# Patient Record
Sex: FEMALE | Race: OTHER | NOT HISPANIC OR LATINO
[De-identification: names, ages, dates, MRNs, and addresses within clinical notes are randomized per-mention and may not be internally consistent; named-entity substitution may affect disease eponyms.]

---

## 2022-01-19 PROBLEM — Z00.00 ENCOUNTER FOR PREVENTIVE HEALTH EXAMINATION: Status: ACTIVE | Noted: 2022-01-19

## 2022-01-24 ENCOUNTER — APPOINTMENT (OUTPATIENT)
Dept: OTOLARYNGOLOGY | Facility: CLINIC | Age: 27
End: 2022-01-24
Payer: MEDICAID

## 2022-01-24 VITALS
TEMPERATURE: 97.9 F | HEART RATE: 86 BPM | WEIGHT: 218 LBS | SYSTOLIC BLOOD PRESSURE: 126 MMHG | DIASTOLIC BLOOD PRESSURE: 86 MMHG | HEIGHT: 68 IN | OXYGEN SATURATION: 98 % | BODY MASS INDEX: 33.04 KG/M2

## 2022-01-24 PROCEDURE — 99072 ADDL SUPL MATRL&STAF TM PHE: CPT

## 2022-01-24 PROCEDURE — 31231 NASAL ENDOSCOPY DX: CPT

## 2022-01-24 PROCEDURE — 99204 OFFICE O/P NEW MOD 45 MIN: CPT | Mod: 25

## 2022-01-24 NOTE — PHYSICAL EXAM
[Nasal Endoscopy Performed] : nasal endoscopy was performed, see procedure section for findings [] : septum deviated bilaterally [de-identified] : +inflammation and evidence of polyposis on the left [de-identified] : + on the left [Midline] : trachea located in midline position [Normal] : no rashes

## 2022-01-24 NOTE — HISTORY OF PRESENT ILLNESS
[de-identified] : 27 yo female who presents with nasal issues.  She notes significant congestion bilaterally.  This has been present for several years.  She notes there is bilateral yellow drainage at times.  Sometime she will have facial discomfort.  No change in smell or taste.  No dental pain.  In the past she has tried nasal steroids, but not much else.  No ENT issues otherwise.  +hx of asthma, unsure of ASA allergy

## 2022-01-24 NOTE — ASSESSMENT
[FreeTextEntry1] : 27 yo with concern for chronic congestion.  On exam there is evidence of septal deviation, turbinate hypertrophy and nasal polyposis.  At this point I am recommending nasal saline, nasal steroids and CT sinus.  Follow up after to review.  I suspect she will need septoplasty, turbinectomy and endoscopic sinus sx.\par \par - nasal saline, nasal steroids\par - CT sinus\par - fu after the above is completed

## 2022-01-24 NOTE — PROCEDURE
[FreeTextEntry6] : -\par Procedure Note\par   \par Pre-operative Diagnosis: congestion\par Post-operative Diagnosis:  nasal polyposis, sinusitis, septal deviation, turbinate hypertrophy\par Anesthesia: Topical\par Procedure: Bilateral nasal endoscopy\par   \par Procedure Details: \par After topical anesthesia and decongestant, the patient was placed in the supine position. The telescope was passed along the left nasal floor to the nasopharynx. It was then passed into the region of the middle meatus, middle turbinate, and the sphenoethmoid region.  An identical procedure was performed on the right side. \par   \par Findings: \par Mucosa: 	                inflamed\par Nasal septum: 	s shaped deviation	\par Discharge: 	none	\par Turbinates: 	hypertrophied	\par Adenoid: 	                normal	\par Posterior choanae: 	normal	\par Eustachian tubes: 	normal	\par Mucous stranding: 	normal 	\par Lesions: 	                Not present	\par   \par Comments: \par Condition: Stable. Patient tolerated procedure well.\par Complications: None\par \par

## 2022-02-03 ENCOUNTER — APPOINTMENT (OUTPATIENT)
Dept: CT IMAGING | Facility: CLINIC | Age: 27
End: 2022-02-03
Payer: MEDICAID

## 2022-02-03 ENCOUNTER — OUTPATIENT (OUTPATIENT)
Dept: OUTPATIENT SERVICES | Facility: HOSPITAL | Age: 27
LOS: 1 days | End: 2022-02-03

## 2022-02-03 ENCOUNTER — RESULT REVIEW (OUTPATIENT)
Age: 27
End: 2022-02-03

## 2022-02-03 PROCEDURE — 70486 CT MAXILLOFACIAL W/O DYE: CPT | Mod: 26

## 2022-02-11 ENCOUNTER — APPOINTMENT (OUTPATIENT)
Dept: OTOLARYNGOLOGY | Facility: CLINIC | Age: 27
End: 2022-02-11
Payer: MEDICAID

## 2022-02-15 ENCOUNTER — APPOINTMENT (OUTPATIENT)
Dept: OTOLARYNGOLOGY | Facility: CLINIC | Age: 27
End: 2022-02-15
Payer: MEDICAID

## 2022-02-15 VITALS
DIASTOLIC BLOOD PRESSURE: 88 MMHG | HEART RATE: 75 BPM | OXYGEN SATURATION: 98 % | WEIGHT: 218 LBS | HEIGHT: 68 IN | SYSTOLIC BLOOD PRESSURE: 123 MMHG | TEMPERATURE: 97.2 F | BODY MASS INDEX: 33.04 KG/M2

## 2022-02-15 PROCEDURE — 99214 OFFICE O/P EST MOD 30 MIN: CPT | Mod: 25

## 2022-02-15 PROCEDURE — 31231 NASAL ENDOSCOPY DX: CPT

## 2022-02-15 PROCEDURE — 99072 ADDL SUPL MATRL&STAF TM PHE: CPT

## 2022-02-15 NOTE — PHYSICAL EXAM
[de-identified] : +inflammation and evidence of polyposis on the left [de-identified] : + on the left

## 2022-02-15 NOTE — ASSESSMENT
[FreeTextEntry1] : 27 yo with concern for chronic congestion.  On exam there is evidence of septal deviation, turbinate hypertrophy and nasal polyposis.  At this point I am recommending nasal saline, nasal steroids were used to no avail.  CT sinus completed with evidence of pansinusitis, concerning features for mycetoma.\par \par At this point the patient is a candidate septoplasty, turbinectomy and endoscopic sinus sx, which I am recommending.  Risk, benefits and alternatives of sinus surgery were discussed including bleeding, infection, pain, risk of anesthesia, injury to the orbit or skull-base, worsened sense of smell, need for further procedures and possible time off of work.  Pt would like to proceed.  Will need pre op clearance and COVID testing.  Will plan in the near future.  She will continue nasal saline and nasal steroids in the interim.\par \par - OR for septoplasty, turbinectomy, endoscopic sinus surgery\par - nasal saline, nasal steroids\par

## 2022-02-15 NOTE — HISTORY OF PRESENT ILLNESS
[de-identified] : 25 yo female who presents with nasal issues.  She notes significant congestion bilaterally.  This has been present for several years.  She notes there is bilateral yellow drainage at times.  Sometime she will have facial discomfort.  No change in smell or taste.  No dental pain.  In the past she has tried nasal steroids, but not much else.  No ENT issues otherwise.  +hx of asthma, unsure of ASA allergy\par - [FreeTextEntry1] : Update 2/15/22\par Pt has been using nasal saline and nasal steroids to minimal avail.  She completed CT sinus and presents to review.  No new ENT issues otherwise.

## 2022-02-15 NOTE — PROCEDURE
[FreeTextEntry6] : Procedure Note\par  \par Pre-operative Diagnosis: congestion\par Post-operative Diagnosis: nasal polyposis, sinusitis, septal deviation, turbinate hypertrophy\par Anesthesia: Topical\par Procedure: Bilateral nasal endoscopy\par  \par Procedure Details: \par After topical anesthesia and decongestant, the patient was placed in the supine position. The telescope was passed along the left nasal floor to the nasopharynx. It was then passed into the region of the middle meatus, middle turbinate, and the sphenoethmoid region. An identical procedure was performed on the right side. \par  \par Findings: \par Mucosa: 	 inflamed\par Nasal septum: 	s shaped deviation	\par Discharge: 	none	\par Turbinates: 	hypertrophied	\par Adenoid: 	 normal	\par Posterior choanae: 	normal	\par Eustachian tubes: 	normal	\par Mucous stranding: 	normal 	\par Lesions: 	 Not present	\par  \par Comments: \par Condition: Stable. Patient tolerated procedure well.\par Complications: None\par \par . \par

## 2022-02-24 RX ORDER — PREDNISONE 10 MG/1
10 TABLET ORAL
Qty: 30 | Refills: 0 | Status: ACTIVE | COMMUNITY
Start: 2022-02-24 | End: 1900-01-01

## 2022-03-01 ENCOUNTER — TRANSCRIPTION ENCOUNTER (OUTPATIENT)
Age: 27
End: 2022-03-01

## 2022-03-01 NOTE — ASU PATIENT PROFILE, ADULT - FALL HARM RISK - UNIVERSAL INTERVENTIONS
Bed in lowest position, wheels locked, appropriate side rails in place/Call bell, personal items and telephone in reach/Instruct patient to call for assistance before getting out of bed or chair/Non-slip footwear when patient is out of bed/[place] to call system/Physically safe environment - no spills, clutter or unnecessary equipment/Purposeful Proactive Rounding/Room/bathroom lighting operational, light cord in reach

## 2022-03-01 NOTE — ASU PATIENT PROFILE, ADULT - VISION (WITH CORRECTIVE LENSES IF THE PATIENT USUALLY WEARS THEM):
Instructed to push fluids. Tylenol and motrin every 6 hours. Complete all antibiotics as ordered. Return to ed for increased sob fever chills vomiting drooling unable to swallow.  Pt v/u     Gorge Alvarado RN  01/01/21 4220
Normal vision: sees adequately in most situations; can see medication labels, newsprint

## 2022-03-01 NOTE — ASU PATIENT PROFILE, ADULT - NS PREOP UNDERSTANDS INFO
no solid food after midnite, can have (black tea , clear apple juice water or gatorade at 09:30A.M./yes

## 2022-03-02 ENCOUNTER — RESULT REVIEW (OUTPATIENT)
Age: 27
End: 2022-03-02

## 2022-03-02 ENCOUNTER — APPOINTMENT (OUTPATIENT)
Dept: OTOLARYNGOLOGY | Facility: AMBULATORY SURGERY CENTER | Age: 27
End: 2022-03-02

## 2022-03-02 ENCOUNTER — OUTPATIENT (OUTPATIENT)
Dept: OUTPATIENT SERVICES | Facility: HOSPITAL | Age: 27
LOS: 1 days | Discharge: HOME | End: 2022-03-02
Payer: MEDICAID

## 2022-03-02 VITALS
HEIGHT: 69 IN | WEIGHT: 209.66 LBS | SYSTOLIC BLOOD PRESSURE: 124 MMHG | DIASTOLIC BLOOD PRESSURE: 68 MMHG | TEMPERATURE: 99 F | RESPIRATION RATE: 16 BRPM | OXYGEN SATURATION: 98 % | HEART RATE: 87 BPM

## 2022-03-02 VITALS
DIASTOLIC BLOOD PRESSURE: 85 MMHG | HEART RATE: 78 BPM | SYSTOLIC BLOOD PRESSURE: 138 MMHG | RESPIRATION RATE: 16 BRPM | OXYGEN SATURATION: 99 %

## 2022-03-02 PROCEDURE — 31237 NSL/SINS NDSC SURG BX POLYPC: CPT | Mod: 50,59

## 2022-03-02 PROCEDURE — 31253 NSL/SINS NDSC TOTAL: CPT | Mod: LT

## 2022-03-02 PROCEDURE — 88305 TISSUE EXAM BY PATHOLOGIST: CPT | Mod: 26

## 2022-03-02 PROCEDURE — 88311 DECALCIFY TISSUE: CPT | Mod: 26

## 2022-03-02 PROCEDURE — 31267 ENDOSCOPY MAXILLARY SINUS: CPT | Mod: 50

## 2022-03-02 PROCEDURE — 30140 RESECT INFERIOR TURBINATE: CPT

## 2022-03-02 PROCEDURE — 31255 NSL/SINS NDSC W/TOT ETHMDCT: CPT | Mod: RT

## 2022-03-02 DEVICE — SYS CATH BALLOON RELIEVA SINUS: Type: IMPLANTABLE DEVICE | Site: BILATERAL | Status: FUNCTIONAL

## 2022-03-02 DEVICE — STENT DRUG ELUTING SINUS BIO ABSORB INTERSECT ENT PROPEL 23MM: Type: IMPLANTABLE DEVICE | Site: BILATERAL | Status: FUNCTIONAL

## 2022-03-02 DEVICE — STENT SINUS DRUG ELUDING PROPEL CONTOUR: Type: IMPLANTABLE DEVICE | Site: BILATERAL | Status: FUNCTIONAL

## 2022-03-02 DEVICE — STENT DRUG ELUTING SINUS INTERSECT ENT PROPEL MINI 16MM: Type: IMPLANTABLE DEVICE | Site: BILATERAL | Status: FUNCTIONAL

## 2022-03-02 RX ORDER — CEFDINIR 300 MG/1
300 CAPSULE ORAL
Qty: 20 | Refills: 0 | Status: ACTIVE | COMMUNITY
Start: 2022-03-02 | End: 1900-01-01

## 2022-03-02 RX ORDER — SODIUM CHLORIDE 9 MG/ML
500 INJECTION, SOLUTION INTRAVENOUS
Refills: 0 | Status: DISCONTINUED | OUTPATIENT
Start: 2022-03-02 | End: 2022-03-02

## 2022-03-02 RX ORDER — FLUTICASONE PROPIONATE 50 MCG
0 SPRAY, SUSPENSION NASAL
Qty: 0 | Refills: 0 | DISCHARGE

## 2022-03-02 RX ORDER — OXYCODONE HYDROCHLORIDE 5 MG/1
5 TABLET ORAL ONCE
Refills: 0 | Status: DISCONTINUED | OUTPATIENT
Start: 2022-03-02 | End: 2022-03-02

## 2022-03-02 RX ORDER — ACETAMINOPHEN 500 MG
650 TABLET ORAL ONCE
Refills: 0 | Status: DISCONTINUED | OUTPATIENT
Start: 2022-03-02 | End: 2022-03-02

## 2022-03-02 RX ORDER — PREDNISONE 10 MG/1
10 TABLET ORAL
Qty: 63 | Refills: 0 | Status: ACTIVE | COMMUNITY
Start: 2022-03-02 | End: 1900-01-01

## 2022-03-02 RX ADMIN — OXYCODONE HYDROCHLORIDE 5 MILLIGRAM(S): 5 TABLET ORAL at 19:40

## 2022-03-02 RX ADMIN — SODIUM CHLORIDE 100 MILLILITER(S): 9 INJECTION, SOLUTION INTRAVENOUS at 19:38

## 2022-03-02 RX ADMIN — OXYCODONE HYDROCHLORIDE 5 MILLIGRAM(S): 5 TABLET ORAL at 20:11

## 2022-03-02 NOTE — ASU DISCHARGE PLAN (ADULT/PEDIATRIC) - ASU DC SPECIAL INSTRUCTIONSFT
DO NOT blow your nose for at least two weeks. DO NOT forcibly spit for one week. Sneeze with your MOUTH OPEN. If the urge to sneeze arises, do not sneeze through your nose and avoid pinching nostrils. Drink without a straw for one week. Avoid strenuous exercise (e.g. heavy lifting) and bending below the waist for one week. Slight bleeding from the nose is not uncommon and may occur for several days after surgery.

## 2022-03-02 NOTE — BRIEF OPERATIVE NOTE - NSICDXBRIEFPREOP_GEN_ALL_CORE_FT
PRE-OP DIAGNOSIS:  Pansinusitis 02-Mar-2022 18:33:49  Ben Howard  Nasal turbinate hypertrophy 02-Mar-2022 18:33:56  Ben Howard  Nasal polyposis 02-Mar-2022 18:34:03  Ben Howard

## 2022-03-02 NOTE — ASU DISCHARGE PLAN (ADULT/PEDIATRIC) - NS MD DC FALL RISK RISK
For information on Fall & Injury Prevention, visit: [email or web address] OR  [email or web address] OR  https://www.cdc.gov/steadi/patient.html (4) rarely moist

## 2022-03-02 NOTE — ASU PREOP CHECKLIST - SITE MARKED BY SURGEON
I have reviewed discharge instructions with the patient. The patient verbalized understanding.  Patient armband removed and shredded n/a

## 2022-03-02 NOTE — BRIEF OPERATIVE NOTE - NSICDXBRIEFPROCEDURE_GEN_ALL_CORE_FT
PROCEDURES:  Endoscopic sinus surgery 02-Mar-2022 18:33:05  Ben Howard  Bilateral turbinectomy 02-Mar-2022 18:33:16  Ben Howard

## 2022-03-02 NOTE — ASU DISCHARGE PLAN (ADULT/PEDIATRIC) - CARE PROVIDER_API CALL
Ben Howard)  Otolaryngology  [address], [address], [place] [address]  Phone: [phone]  Fax: [phone]  Follow Up Time:

## 2022-03-02 NOTE — BRIEF OPERATIVE NOTE - NSICDXBRIEFPOSTOP_GEN_ALL_CORE_FT
POST-OP DIAGNOSIS:  Pansinusitis 02-Mar-2022 18:34:33  Ben Howard  Nasal turbinate hypertrophy 02-Mar-2022 18:34:44  Ben Howard

## 2022-03-11 ENCOUNTER — APPOINTMENT (OUTPATIENT)
Dept: OTOLARYNGOLOGY | Facility: CLINIC | Age: 27
End: 2022-03-11
Payer: MEDICAID

## 2022-03-11 VITALS
SYSTOLIC BLOOD PRESSURE: 119 MMHG | WEIGHT: 209.44 LBS | HEART RATE: 74 BPM | TEMPERATURE: 98 F | HEIGHT: 69 IN | DIASTOLIC BLOOD PRESSURE: 81 MMHG | BODY MASS INDEX: 31.02 KG/M2

## 2022-03-11 PROCEDURE — 31237 NSL/SINS NDSC SURG BX POLYPC: CPT | Mod: 50

## 2022-03-11 PROCEDURE — 99024 POSTOP FOLLOW-UP VISIT: CPT

## 2022-03-11 NOTE — PROCEDURE
[FreeTextEntry6] : - \par Pre-operative Diagnosis: Sinusitis, status post endoscopic sinus surgery\par Post-operative Diagnosis: same\par Anesthesia: Topical\par Procedure: Bilateral nasal/sinus debridement\par   \par Procedure Details: \par After topical anesthesia and decongestant, the patient was placed in the supine position. The telescope was passed along the left nasal floor to the nasopharynx. It was then passed into the region of the middle meatus, middle turbinate, and the sphenoethmoid region.  Bilaterally the nasal cavity was cleaned using a mixture of instruments including suction as well as bayonet forceps and Blaeshteryley sinus instruments.  An identical procedure was performed on the right side. \par   \par Findings: \par Mucosa: 	                normal	\par Nasal septum: 	midline 	\par Discharge: 	hemostatic gel and blood clot was removed from the ostiomeatal complex, nasal cavity bilaterally. 	\par Turbinates: 	surgically reduced and outfractured 	\par Adenoid: 	                normal	\par Posterior choanae: 	normal	\par Eustachian tubes: 	normal	\par Mucous stranding: 	normal 	\par Lesions:        	Not present	\par   \par Comments: \par propel stents remain in place on right, removed on left\par Significant debris was suctioned and cleaned from the ostiomeatal complex bilaterally. The patient noted increased ability to breathe through their nose at the end of the procedure and there was no evidence of any epistaxis.\par   \par Condition:\par Stable. Patient tolerated procedure well.\par   \par Complications:\par None\par \par

## 2022-03-11 NOTE — ASSESSMENT
[FreeTextEntry1] : 27 yo with concern for chronic congestion.  On exam there is evidence of septal deviation, turbinate hypertrophy and nasal polyposis.  At this point I am recommending nasal saline, nasal steroids were used to no avail.  CT sinus completed with evidence of pansinusitis, concerning features for mycetoma.\par \par POD #9 s/p endoscopic sinus sx and turbinectomy with removal of nasal polyposis.  She has done well post operatively and notes improvement in symptoms.  She tolerated her first debridement today.  Will start her on nasal steroids and nasal saline rinses.  Follow up 1 week for repeat debridement.\par \par - nasal saline, nasal steroids\par - fu 1 week for repeat debridement.\par

## 2022-03-11 NOTE — HISTORY OF PRESENT ILLNESS
[de-identified] : 27 yo female who presents with nasal issues.  She notes significant congestion bilaterally.  This has been present for several years.  She notes there is bilateral yellow drainage at times.  Sometime she will have facial discomfort.  No change in smell or taste.  No dental pain.  In the past she has tried nasal steroids, but not much else.  No ENT issues otherwise.  +hx of asthma, unsure of ASA allergy\par -\par Update 2/15/22\par Pt has been using nasal saline and nasal steroids to minimal avail.  She completed CT sinus and presents to review.  No new ENT issues otherwise.\par - [FreeTextEntry1] : Update 3/11/22\par POD#9 s/p Endoscopic Sinus Surgery and Turbinectomy with removal of nasal polyposis.  Overall stable and well.  No significant drainage at this point.  Pain well controlled.  She already notes significant improvement in breathing and smell.  No new ENT issues at this time.  Path still pending.

## 2022-03-11 NOTE — PHYSICAL EXAM
[Nasal Endoscopy Performed] : nasal endoscopy was performed, see procedure section for findings [Normal] : inferior turbinates and middle turbinates are normal [de-identified] : surgically reduced and outfractured

## 2022-03-14 LAB — SURGICAL PATHOLOGY STUDY: SIGNIFICANT CHANGE UP

## 2022-03-16 ENCOUNTER — NON-APPOINTMENT (OUTPATIENT)
Age: 27
End: 2022-03-16

## 2022-03-16 RX ORDER — BUDESONIDE 32 UG/1
32 SPRAY, METERED NASAL DAILY
Qty: 1 | Refills: 0 | Status: ACTIVE | COMMUNITY
Start: 2022-03-16 | End: 1900-01-01

## 2022-03-16 RX ORDER — BUDESONIDE 32 UG/1
32 SPRAY, METERED NASAL DAILY
Qty: 1 | Refills: 0 | Status: DISCONTINUED | COMMUNITY
Start: 2022-03-16 | End: 2022-03-16

## 2022-03-18 ENCOUNTER — APPOINTMENT (OUTPATIENT)
Dept: OTOLARYNGOLOGY | Facility: CLINIC | Age: 27
End: 2022-03-18
Payer: MEDICAID

## 2022-03-18 VITALS
DIASTOLIC BLOOD PRESSURE: 88 MMHG | WEIGHT: 209 LBS | TEMPERATURE: 97 F | BODY MASS INDEX: 30.96 KG/M2 | SYSTOLIC BLOOD PRESSURE: 124 MMHG | HEART RATE: 84 BPM | OXYGEN SATURATION: 98 % | HEIGHT: 69 IN

## 2022-03-18 PROCEDURE — 31237 NSL/SINS NDSC SURG BX POLYPC: CPT | Mod: 50

## 2022-03-18 PROCEDURE — 99024 POSTOP FOLLOW-UP VISIT: CPT

## 2022-03-18 RX ORDER — OXYCODONE AND ACETAMINOPHEN 5; 325 MG/1; MG/1
5-325 TABLET ORAL
Qty: 30 | Refills: 0 | Status: DISCONTINUED | COMMUNITY
Start: 2022-03-02 | End: 2022-03-18

## 2022-03-18 NOTE — ASSESSMENT
[FreeTextEntry1] : 27 yo with concern for chronic congestion.  On exam there is evidence of septal deviation, turbinate hypertrophy and nasal polyposis.  At this point I am recommending nasal saline, nasal steroids were used to no avail.  CT sinus completed with evidence of pansinusitis, concerning features for mycetoma.\par \par POD #16 s/p endoscopic sinus sx and turbinectomy with removal of nasal polyposis.  She has done well post operatively and notes significant improvement in symptoms.  She tolerated her second debridement today.  Will start her on nasal steroids (budesonide) mixed with nasal saline rinses.  Follow up 1 week for repeat debridement.\par \par - nasal saline, nasal steroids (budesonide)\par - fu 1 week for repeat debridement.\par

## 2022-03-18 NOTE — PHYSICAL EXAM
[Nasal Endoscopy Performed] : nasal endoscopy was performed, see procedure section for findings [Normal] : inferior turbinates and middle turbinates are normal [de-identified] : surgically reduced and outfractured

## 2022-03-18 NOTE — PROCEDURE
[FreeTextEntry6] : - \par Pre-operative Diagnosis: Sinusitis, status post endoscopic sinus surgery\par Post-operative Diagnosis: same\par Anesthesia: Topical\par Procedure: Bilateral nasal/sinus debridement\par  \par Procedure Details: \par After topical anesthesia and decongestant, the patient was placed in the supine position. The telescope was passed along the left nasal floor to the nasopharynx. It was then passed into the region of the middle meatus, middle turbinate, and the sphenoethmoid region. Bilaterally the nasal cavity was cleaned using a mixture of instruments including suction as well as bayonet forceps and BlaPlayEarthley sinus instruments. An identical procedure was performed on the right side. \par  \par Findings: \par Mucosa: 	 normal	\par Nasal septum: 	midline 	\par Discharge: 	hemostatic gel and blood clot was removed from the ostiomeatal complex, nasal cavity bilaterally. 	\par Turbinates: 	surgically reduced and outfractured 	\par Adenoid: 	 normal	\par Posterior choanae: 	normal	\par Eustachian tubes: 	normal	\par Mucous stranding: 	normal 	\par Lesions: 	Not present	\par  \par Comments: \par propel removed on the right\par Significant debris was suctioned and cleaned from the ostiomeatal complex bilaterally. The patient noted increased ability to breathe through their nose at the end of the procedure and there was no evidence of any epistaxis.\par  \par Condition:\par Stable. Patient tolerated procedure well.\par  \par Complications:\par None

## 2022-03-18 NOTE — HISTORY OF PRESENT ILLNESS
[de-identified] : 25 yo female who presents with nasal issues.  She notes significant congestion bilaterally.  This has been present for several years.  She notes there is bilateral yellow drainage at times.  Sometime she will have facial discomfort.  No change in smell or taste.  No dental pain.  In the past she has tried nasal steroids, but not much else.  No ENT issues otherwise.  +hx of asthma, unsure of ASA allergy\par -\par Update 2/15/22\par Pt has been using nasal saline and nasal steroids to minimal avail.  She completed CT sinus and presents to review.  No new ENT issues otherwise.\par -\par Update 3/11/22\par POD#9 s/p Endoscopic Sinus Surgery and Turbinectomy with removal of nasal polyposis.  Overall stable and well.  No significant drainage at this point.  Pain well controlled.  She already notes significant improvement in breathing and smell.  No new ENT issues at this time.  Path still pending.\par - [FreeTextEntry1] : Update 3/18/22\par POD#16 s/p Endoscopic Sinus Surgery and Turbinectomy with removal of nasal polyposis.  Overall stable and well.  No significant drainage at this point.  She notes significant improvement in breathing and smell.  No new ENT issues at this time.

## 2022-03-25 ENCOUNTER — APPOINTMENT (OUTPATIENT)
Dept: OTOLARYNGOLOGY | Facility: CLINIC | Age: 27
End: 2022-03-25
Payer: MEDICAID

## 2022-03-25 VITALS
HEIGHT: 69 IN | DIASTOLIC BLOOD PRESSURE: 83 MMHG | OXYGEN SATURATION: 98 % | SYSTOLIC BLOOD PRESSURE: 116 MMHG | TEMPERATURE: 97.3 F | BODY MASS INDEX: 30.96 KG/M2 | WEIGHT: 209 LBS | HEART RATE: 94 BPM

## 2022-03-25 PROCEDURE — 99024 POSTOP FOLLOW-UP VISIT: CPT

## 2022-03-25 PROCEDURE — 31237 NSL/SINS NDSC SURG BX POLYPC: CPT | Mod: 58

## 2022-03-25 NOTE — HISTORY OF PRESENT ILLNESS
[de-identified] : 27 yo female who presents with nasal issues.  She notes significant congestion bilaterally.  This has been present for several years.  She notes there is bilateral yellow drainage at times.  Sometime she will have facial discomfort.  No change in smell or taste.  No dental pain.  In the past she has tried nasal steroids, but not much else.  No ENT issues otherwise.  +hx of asthma, unsure of ASA allergy\par -\par Update 2/15/22\par Pt has been using nasal saline and nasal steroids to minimal avail.  She completed CT sinus and presents to review.  No new ENT issues otherwise.\par -\par Update 3/11/22\par POD#9 s/p Endoscopic Sinus Surgery and Turbinectomy with removal of nasal polyposis.  Overall stable and well.  No significant drainage at this point.  Pain well controlled.  She already notes significant improvement in breathing and smell.  No new ENT issues at this time.  Path still pending.\par -\par Update 3/18/22\par POD#16 s/p Endoscopic Sinus Surgery and Turbinectomy with removal of nasal polyposis.  Overall stable and well.  No significant drainage at this point.  She notes significant improvement in breathing and smell.  No new ENT issues at this time.  \par - [FreeTextEntry1] : Update 3/25/22\par POD#23 s/p Endoscopic Sinus Surgery and Turbinectomy with removal of nasal polyposis.  She has started using nasal saline and budesonide.  Overall stable and well.  No significant drainage at this point.  She notes significant improvement in breathing and smell.  No new ENT issues at this time.

## 2022-03-25 NOTE — ASSESSMENT
[FreeTextEntry1] : 25 yo with concern for chronic congestion.  On exam there is evidence of septal deviation, turbinate hypertrophy and nasal polyposis.  At this point I am recommending nasal saline, nasal steroids were used to no avail.  CT sinus completed with evidence of pansinusitis, concerning features for mycetoma.\par \par POD #23 s/p endoscopic sinus sx and turbinectomy with removal of nasal polyposis.  She has done well post operatively and notes significant improvement in symptoms.  She tolerated her second debridement today.  Will continue her on nasal steroids (budesonide) mixed with nasal saline rinses.  Follow up 5 week for repeat debridement.\par \par Pt unsure of hx of asthma.  I am recommending consultation with Dr. Dickens for evaluation and management.  If + she may benefit from post surgical use of dupixent.\par \par - c/s pulm, Dr. Dickens, for evaluation of possible hx of asthma\par - nasal saline, nasal steroids (budesonide)\par - fu 5 week for repeat debridement.\par

## 2022-03-25 NOTE — PROCEDURE
[FreeTextEntry6] :  - \par Pre-operative Diagnosis: Sinusitis, status post endoscopic sinus surgery\par Post-operative Diagnosis: same\par Anesthesia: Topical\par Procedure: Bilateral nasal/sinus debridement\par  \par Procedure Details: \par After topical anesthesia and decongestant, the patient was placed in the supine position. The telescope was passed along the left nasal floor to the nasopharynx. It was then passed into the region of the middle meatus, middle turbinate, and the sphenoethmoid region. Bilaterally the nasal cavity was cleaned using a mixture of instruments including suction as well as bayonet forceps and BlaFanmodeley sinus instruments. An identical procedure was performed on the right side. \par  \par Findings: \par Mucosa: 	 normal	\par Nasal septum: 	midline 	\par Discharge: 	hemostatic gel and blood clot was removed from the ostiomeatal complex, nasal cavity bilaterally. 	\par Turbinates: 	surgically reduced and outfractured 	\par Adenoid: 	 normal	\par Posterior choanae: 	normal	\par Eustachian tubes: 	normal	\par Mucous stranding: 	normal 	\par Lesions: 	Not present	\par  \par Comments: \par propel removed on the right\par Significant debris was suctioned and cleaned from the ostiomeatal complex bilaterally. The patient noted increased ability to breathe through their nose at the end of the procedure and there was no evidence of any epistaxis.\par  \par Condition:\par Stable. Patient tolerated procedure well.\par  \par Complications:\par None. \par  n/a

## 2022-03-25 NOTE — PHYSICAL EXAM
[Nasal Endoscopy Performed] : nasal endoscopy was performed, see procedure section for findings [Normal] : inferior turbinates and middle turbinates are normal [de-identified] : surgically reduced and outfractured

## 2022-04-20 PROBLEM — J45.909 UNSPECIFIED ASTHMA, UNCOMPLICATED: Chronic | Status: ACTIVE | Noted: 2022-03-02

## 2022-04-29 ENCOUNTER — APPOINTMENT (OUTPATIENT)
Dept: OTOLARYNGOLOGY | Facility: CLINIC | Age: 27
End: 2022-04-29
Payer: MEDICAID

## 2022-04-29 VITALS
HEART RATE: 78 BPM | WEIGHT: 209 LBS | OXYGEN SATURATION: 97 % | DIASTOLIC BLOOD PRESSURE: 81 MMHG | TEMPERATURE: 97.8 F | BODY MASS INDEX: 30.96 KG/M2 | HEIGHT: 69 IN | SYSTOLIC BLOOD PRESSURE: 117 MMHG

## 2022-04-29 PROCEDURE — 31237 NSL/SINS NDSC SURG BX POLYPC: CPT | Mod: 50,58

## 2022-04-29 PROCEDURE — 99214 OFFICE O/P EST MOD 30 MIN: CPT | Mod: 25

## 2022-04-29 NOTE — PROCEDURE
[FreeTextEntry6] : - \par Pre-operative Diagnosis: Sinusitis, status post endoscopic sinus surgery\par Post-operative Diagnosis: same\par Anesthesia: Topical\par Procedure: Bilateral nasal/sinus debridement\par  \par Procedure Details: \par After topical anesthesia and decongestant, the patient was placed in the supine position. The telescope was passed along the left nasal floor to the nasopharynx. It was then passed into the region of the middle meatus, middle turbinate, and the sphenoethmoid region. Bilaterally the nasal cavity was cleaned using a mixture of instruments including suction as well as bayonet forceps and BlaNexis Visionley sinus instruments. An identical procedure was performed on the right side. \par  \par Findings: \par Mucosa: 	 normal	\par Nasal septum: 	midline 	\par Discharge: 	minimal gel and blood clot was removed from the ostiomeatal complex, nasal cavity bilaterally. 	\par Turbinates: 	surgically reduced and outfractured 	\par Adenoid: 	 normal	\par Posterior choanae: 	normal	\par Eustachian tubes: 	normal	\par Mucous stranding: 	normal 	\par Lesions: 	Not present	\par  \par Comments: \par propel removed on the right\par Significant debris was suctioned and cleaned from the ostiomeatal complex bilaterally. The patient noted increased ability to breathe through their nose at the end of the procedure and there was no evidence of any epistaxis.\par  \par Condition:\par Stable. Patient tolerated procedure well.\par  \par Complications:\par None. \par

## 2022-04-29 NOTE — ASSESSMENT
[FreeTextEntry1] : 27 yo with concern for chronic congestion.  On exam there is evidence of septal deviation, turbinate hypertrophy and nasal polyposis.  At this point I am recommending nasal saline, nasal steroids were used to no avail.  CT sinus completed with evidence of pansinusitis, concerning features for mycetoma.\par \par   6 weeks s/p endoscopic sinus sx and turbinectomy with removal of nasal polyposis.  She has done well post operatively and notes significant improvement in symptoms.  She tolerated her second debridement today.  Will continue her on nasal steroids (budesonide) mixed with nasal saline rinses.  Follow up  3 months for repeat debridement.\par \par Pt unsure of hx of asthma.  I am recommending consultation with Dr. Dickens for evaluation and management.   patient has not done this as of yet.  If + she may benefit from post surgical use of dupixent.\par \par - c/s pulm, Dr. Dickens, for evaluation of possible hx of asthma,   Will remind at next visit\par - nasal saline, nasal steroids (budesonide)\par - fu   3 months for repeat evaluation\par

## 2022-04-29 NOTE — HISTORY OF PRESENT ILLNESS
[de-identified] : 25 yo female who presents with nasal issues.  She notes significant congestion bilaterally.  This has been present for several years.  She notes there is bilateral yellow drainage at times.  Sometime she will have facial discomfort.  No change in smell or taste.  No dental pain.  In the past she has tried nasal steroids, but not much else.  No ENT issues otherwise.  +hx of asthma, unsure of ASA allergy\par -\par Update 2/15/22\par Pt has been using nasal saline and nasal steroids to minimal avail.  She completed CT sinus and presents to review.  No new ENT issues otherwise.\par -\par Update 3/11/22\par POD#9 s/p Endoscopic Sinus Surgery and Turbinectomy with removal of nasal polyposis.  Overall stable and well.  No significant drainage at this point.  Pain well controlled.  She already notes significant improvement in breathing and smell.  No new ENT issues at this time.  Path still pending.\par -\par Update 3/18/22\par POD#16 s/p Endoscopic Sinus Surgery and Turbinectomy with removal of nasal polyposis.  Overall stable and well.  No significant drainage at this point.  She notes significant improvement in breathing and smell.  No new ENT issues at this time.  \par -\par Update 3/25/22\par POD#23 s/p Endoscopic Sinus Surgery and Turbinectomy with removal of nasal polyposis.  She has started using nasal saline and budesonide.  Overall stable and well.  No significant drainage at this point.  She notes significant improvement in breathing and smell.  No new ENT issues at this time.  \par - [FreeTextEntry1] : Update  4/29/2022\par  6 weeks s/p Endoscopic Sinus Surgery and Turbinectomy with removal of nasal polyposis.  She has been using nasal saline and budesonide.   she is overall stable and well.  She notes significant improvement in terms of breathing.  She notes improvement in terms of smell and taste.  She denies any facial pain, discomfort.  No new ear, nose, throat symptoms otherwise.

## 2022-04-29 NOTE — PHYSICAL EXAM
[Nasal Endoscopy Performed] : nasal endoscopy was performed, see procedure section for findings [Normal] : inferior turbinates and middle turbinates are normal [de-identified] : surgically reduced and outfractured

## 2022-06-02 ENCOUNTER — APPOINTMENT (OUTPATIENT)
Dept: PULMONOLOGY | Facility: CLINIC | Age: 27
End: 2022-06-02

## 2022-07-29 ENCOUNTER — APPOINTMENT (OUTPATIENT)
Dept: OTOLARYNGOLOGY | Facility: CLINIC | Age: 27
End: 2022-07-29

## 2022-07-29 VITALS
WEIGHT: 209 LBS | DIASTOLIC BLOOD PRESSURE: 80 MMHG | HEIGHT: 69 IN | TEMPERATURE: 97.6 F | BODY MASS INDEX: 30.96 KG/M2 | HEART RATE: 80 BPM | SYSTOLIC BLOOD PRESSURE: 119 MMHG

## 2022-07-29 PROCEDURE — 99214 OFFICE O/P EST MOD 30 MIN: CPT | Mod: 25

## 2022-07-29 PROCEDURE — 31237 NSL/SINS NDSC SURG BX POLYPC: CPT | Mod: 50

## 2022-07-31 NOTE — ASSESSMENT
[FreeTextEntry1] : 25 yo with concern for chronic congestion.  On exam there is evidence of septal deviation, turbinate hypertrophy and nasal polyposis.  At this point I am recommending nasal saline, nasal steroids were used to no avail.  CT sinus completed with evidence of pansinusitis, concerning features for mycetoma.\par \par  4mo s/p endoscopic sinus sx and turbinectomy with removal of nasal polyposis.  She has done well post operatively and notes significant improvement in symptoms.  She tolerated her second debridement today.  Will continue her on nasal steroids (budesonide) mixed with nasal saline rinses.  Follow up  3 months for repeat debridement.\par \par Pt unsure of hx of asthma.  I am recommending consultation with Dr. Dickens for evaluation and management.   patient has not done this as of yet.  If + she may benefit from post surgical use of dupixent.\par \par - c/s pulm, Dr. Dickens, for evaluation of possible hx of asthma,   Will remind at next visit\par - nasal saline, nasal steroids (budesonide)\par - fu   3 months for repeat evaluation\par

## 2022-07-31 NOTE — PHYSICAL EXAM
[Nasal Endoscopy Performed] : nasal endoscopy was performed, see procedure section for findings [Normal] : inferior turbinates and middle turbinates are normal [de-identified] : surgically reduced and outfractured

## 2022-07-31 NOTE — PROCEDURE
[FreeTextEntry6] :  - \par Pre-operative Diagnosis: Sinusitis, status post endoscopic sinus surgery\par Post-operative Diagnosis: same\par Anesthesia: Topical\par Procedure: Bilateral nasal/sinus debridement\par  \par Procedure Details: \par After topical anesthesia and decongestant, the patient was placed in the supine position. The telescope was passed along the left nasal floor to the nasopharynx. It was then passed into the region of the middle meatus, middle turbinate, and the sphenoethmoid region. Bilaterally the nasal cavity was cleaned using a mixture of instruments including suction as well as bayonet forceps and BlaTrackVialey sinus instruments. An identical procedure was performed on the right side. \par  \par Findings: \par Mucosa: 	 normal	\par Nasal septum: 	midline 	\par Discharge: 	minimal debris removed from OMC and maxillary sinus bilaterally\par Turbinates: 	surgically reduced and outfractured 	\par Adenoid: 	 normal	\par Posterior choanae: 	normal	\par Eustachian tubes: 	normal	\par Mucous stranding: 	normal 	\par Lesions: 	Not present	\par  \par Comments: \par debris was suctioned and cleaned from the ostiomeatal complex bilaterally. The patient noted increased ability to breathe through their nose at the end of the procedure and there was no evidence of any epistaxis.\par  \par Condition:\par Stable. Patient tolerated procedure well.\par  \par Complications:\par None. \par  \par

## 2022-07-31 NOTE — HISTORY OF PRESENT ILLNESS
[de-identified] : 27 yo female who presents with nasal issues. She notes significant congestion bilaterally. This has been present for several years. She notes there is bilateral yellow drainage at times. Sometime she will have facial discomfort. No change in smell or taste. No dental pain. In the past she has tried nasal steroids, but not much else. No ENT issues otherwise. +hx of asthma, unsure of ASA allergy\par -\par Update 2/15/22\par Pt has been using nasal saline and nasal steroids to minimal avail. She completed CT sinus and presents to review. No new ENT issues otherwise.\par -\par Update 3/11/22\par POD#9 s/p Endoscopic Sinus Surgery and Turbinectomy with removal of nasal polyposis. Overall stable and well. No significant drainage at this point. Pain well controlled. She already notes significant improvement in breathing and smell. No new ENT issues at this time. Path still pending.\par -\par Update 3/18/22\par POD#16 s/p Endoscopic Sinus Surgery and Turbinectomy with removal of nasal polyposis. Overall stable and well. No significant drainage at this point. She notes significant improvement in breathing and smell. No new ENT issues at this time. \par -\par Update 3/25/22\par POD#23 s/p Endoscopic Sinus Surgery and Turbinectomy with removal of nasal polyposis. She has started using nasal saline and budesonide. Overall stable and well. No significant drainage at this point. She notes significant improvement in breathing and smell. No new ENT issues at this time. \par - \par  Update 4/29/2022\par  6 weeks s/p Endoscopic Sinus Surgery and Turbinectomy with removal of nasal polyposis. She has been using nasal saline and budesonide. she is overall stable and well. She notes significant improvement in terms of breathing. She notes improvement in terms of smell and taste. She denies any facial pain, discomfort. No new ear, nose, throat symptoms otherwise. \par - [FreeTextEntry1] : Update 7/29/22\par Patient presents today for follow 4 months s/p endoscopic sinus surgery and turbinectomy with removal of nasal polyps. She reports sneezing and nasal pruritus for a few days, brought on by perfume and strong smells. No nasal congestion, drainage or facial pressure. Continues to use nasal saline irrigation and budesonide. Has not seen pulm yet.  No other ENT issues.

## 2022-10-25 ENCOUNTER — APPOINTMENT (OUTPATIENT)
Dept: OTOLARYNGOLOGY | Facility: CLINIC | Age: 27
End: 2022-10-25

## 2022-10-27 ENCOUNTER — APPOINTMENT (OUTPATIENT)
Dept: OTOLARYNGOLOGY | Facility: CLINIC | Age: 27
End: 2022-10-27

## 2022-10-27 VITALS
SYSTOLIC BLOOD PRESSURE: 125 MMHG | OXYGEN SATURATION: 98 % | DIASTOLIC BLOOD PRESSURE: 85 MMHG | BODY MASS INDEX: 30.96 KG/M2 | TEMPERATURE: 98 F | WEIGHT: 209 LBS | HEIGHT: 69 IN | HEART RATE: 77 BPM

## 2022-10-27 DIAGNOSIS — J33.9 NASAL POLYP, UNSPECIFIED: ICD-10-CM

## 2022-10-27 DIAGNOSIS — J34.3 HYPERTROPHY OF NASAL TURBINATES: ICD-10-CM

## 2022-10-27 DIAGNOSIS — J32.9 CHRONIC SINUSITIS, UNSPECIFIED: ICD-10-CM

## 2022-10-27 DIAGNOSIS — R09.81 NASAL CONGESTION: ICD-10-CM

## 2022-10-27 DIAGNOSIS — J34.2 DEVIATED NASAL SEPTUM: ICD-10-CM

## 2022-10-27 PROCEDURE — 31237 NSL/SINS NDSC SURG BX POLYPC: CPT | Mod: 50

## 2022-10-27 PROCEDURE — 99214 OFFICE O/P EST MOD 30 MIN: CPT | Mod: 25

## 2022-10-27 NOTE — PROCEDURE
[FreeTextEntry6] : - \par Pre-operative Diagnosis: Sinusitis, status post endoscopic sinus surgery\par Post-operative Diagnosis: same\par Anesthesia: Topical\par Procedure: Bilateral nasal/sinus debridement\par  \par Procedure Details: \par After topical anesthesia and decongestant, the patient was placed in the supine position. The telescope was passed along the left nasal floor to the nasopharynx. It was then passed into the region of the middle meatus, middle turbinate, and the sphenoethmoid region. Bilaterally the nasal cavity was cleaned using a mixture of instruments including suction as well as bayonet forceps and BlaOmnitureley sinus instruments. An identical procedure was performed on the right side. \par  \par Findings: \par Mucosa: 	 normal	\par Nasal septum: 	midline 	\par Discharge: 	minimal debris removed from OMC and maxillary sinus bilaterally\par Turbinates: 	surgically reduced and outfractured 	\par Adenoid: 	 normal	\par Posterior choanae: 	normal	\par Eustachian tubes: 	normal	\par Mucous stranding: 	normal 	\par Lesions: 	Not present	\par  \par Comments: \par debris was suctioned and cleaned from the ostiomeatal complex bilaterally. The patient noted increased ability to breathe through their nose at the end of the procedure and there was no evidence of any epistaxis.\par  \par Condition:\par Stable. Patient tolerated procedure well.\par  \par Complications:\par None. \par

## 2022-10-27 NOTE — ASSESSMENT
[FreeTextEntry1] : 25 yo with concern for chronic congestion.  On exam there is evidence of septal deviation, turbinate hypertrophy and nasal polyposis.  At this point I am recommending nasal saline, nasal steroids were used to no avail.  CT sinus completed with evidence of pansinusitis, concerning features for mycetoma.\par \par  7mo s/p endoscopic sinus sx and turbinectomy with removal of nasal polyposis.  She has done well post operatively and notes significant improvement in symptoms.  She tolerated her debridement today.  Will continue her on nasal steroids (budesonide) mixed with nasal saline rinses.  Follow up  3 months for repeat debridement.\par \par Pt unsure of hx of asthma.  I am recommending consultation with Dr. Dickens for evaluation and management.   Patient has not done this as of yet.  If + she may benefit from post surgical use of dupixent.\par \par - c/s pulm, Dr. Dickens, for evaluation of possible hx of asthma,   Will remind at next visit\par - nasal saline, nasal steroids (budesonide),   We will switch to use right before bedtime versus morning to help with postnasal drip when lying supine\par - fu 3 months for repeat evaluation\par

## 2022-10-27 NOTE — HISTORY OF PRESENT ILLNESS
[de-identified] : 27 yo female who presents with nasal issues.  She notes significant congestion bilaterally.  This has been present for several years.  She notes there is bilateral yellow drainage at times.  Sometime she will have facial discomfort.  No change in smell or taste.  No dental pain.  In the past she has tried nasal steroids, but not much else.  No ENT issues otherwise.  +hx of asthma, unsure of ASA allergy\par -\par Update 2/15/22\par Pt has been using nasal saline and nasal steroids to minimal avail.  She completed CT sinus and presents to review.  No new ENT issues otherwise.\par -\par Update 3/11/22\par POD#9 s/p Endoscopic Sinus Surgery and Turbinectomy with removal of nasal polyposis.  Overall stable and well.  No significant drainage at this point.  Pain well controlled.  She already notes significant improvement in breathing and smell.  No new ENT issues at this time.  Path still pending.\par -\par Update 3/18/22\par POD#16 s/p Endoscopic Sinus Surgery and Turbinectomy with removal of nasal polyposis.  Overall stable and well.  No significant drainage at this point.  She notes significant improvement in breathing and smell.  No new ENT issues at this time.  \par -\par Update 3/25/22\par POD#23 s/p Endoscopic Sinus Surgery and Turbinectomy with removal of nasal polyposis.  She has started using nasal saline and budesonide.  Overall stable and well.  No significant drainage at this point.  She notes significant improvement in breathing and smell.  No new ENT issues at this time.  \par -\par Update  4/29/2022\par  6 weeks s/p Endoscopic Sinus Surgery and Turbinectomy with removal of nasal polyposis.  She has been using nasal saline and budesonide.   she is overall stable and well.  She notes significant improvement in terms of breathing.  She notes improvement in terms of smell and taste.  She denies any facial pain, discomfort.  No new ear, nose, throat symptoms otherwise.  \par -\par Update 7/29/22\par Patient presents today for follow 4 months s/p endoscopic sinus surgery and turbinectomy with removal of nasal polyps. She reports sneezing and nasal pruritus for a few days, brought on by perfume and strong smells. No nasal congestion, drainage or facial pressure. Continues to use nasal saline irrigation and budesonide. Has not seen pulm yet. No other ENT issues. \par - [FreeTextEntry1] : Update 10/27/22\par Patient presents today for follow 7 months s/p endoscopic sinus surgery and turbinectomy with removal of nasal polyps. No nasal congestion, drainage or facial pressure. Continues to use nasal saline irrigation and budesonide.  She continues to note significant improvement in terms of her symptomatology.  She is still happy with the results.  Her only complaint is that she feels that there might be some drainage at nighttime which could cause some irritation in her throat. Has not seen pulm yet. No other ENT issues.

## 2022-12-01 ENCOUNTER — EMERGENCY (EMERGENCY)
Facility: HOSPITAL | Age: 27
LOS: 1 days | Discharge: HOME | End: 2022-12-01
Attending: EMERGENCY MEDICINE | Admitting: EMERGENCY MEDICINE
Payer: COMMERCIAL

## 2022-12-01 VITALS
HEART RATE: 91 BPM | OXYGEN SATURATION: 100 % | DIASTOLIC BLOOD PRESSURE: 86 MMHG | SYSTOLIC BLOOD PRESSURE: 128 MMHG | WEIGHT: 199.96 LBS | RESPIRATION RATE: 19 BRPM | TEMPERATURE: 98 F | HEIGHT: 69 IN

## 2022-12-01 LAB
FLUAV AG NPH QL: SIGNIFICANT CHANGE UP
FLUBV AG NPH QL: SIGNIFICANT CHANGE UP
RSV RNA NPH QL NAA+NON-PROBE: SIGNIFICANT CHANGE UP
SARS-COV-2 RNA SPEC QL NAA+PROBE: SIGNIFICANT CHANGE UP

## 2022-12-01 PROCEDURE — 94640 AIRWAY INHALATION TREATMENT: CPT

## 2022-12-01 PROCEDURE — 87637 SARSCOV2&INF A&B&RSV AMP PRB: CPT

## 2022-12-01 PROCEDURE — 99283 EMERGENCY DEPT VISIT LOW MDM: CPT | Mod: 25

## 2022-12-01 PROCEDURE — 99284 EMERGENCY DEPT VISIT MOD MDM: CPT

## 2022-12-01 RX ORDER — FLUTICASONE PROPIONATE 50 MCG
1 SPRAY, SUSPENSION NASAL
Qty: 1 | Refills: 0
Start: 2022-12-01 | End: 2022-12-30

## 2022-12-01 RX ORDER — ALBUTEROL 90 UG/1
2 AEROSOL, METERED ORAL
Qty: 1 | Refills: 0
Start: 2022-12-01 | End: 2022-12-07

## 2022-12-01 RX ORDER — IPRATROPIUM/ALBUTEROL SULFATE 18-103MCG
3 AEROSOL WITH ADAPTER (GRAM) INHALATION ONCE
Refills: 0 | Status: COMPLETED | OUTPATIENT
Start: 2022-12-01 | End: 2022-12-01

## 2022-12-01 RX ADMIN — Medication 3 MILLILITER(S): at 14:14

## 2022-12-01 RX ADMIN — Medication 60 MILLIGRAM(S): at 14:14

## 2022-12-01 NOTE — ED PROVIDER NOTE - CLINICAL SUMMARY MEDICAL DECISION MAKING FREE TEXT BOX
27 yo female with hx of asthma as a child, no recent flare ups  2 days of URI symptoms  feeling sob.   normal pulse ox, normal exam  probable viral illness with RAD  will give neb, steroids  flonase may help pt co nasal congestion irritating throat  will send covid/flu swabs, pt lives with mother/brother but they are not sick 27 yo female with hx of asthma as a child, no recent flare ups  2 days of URI symptoms  feeling sob.   normal pulse ox, normal exam, well appearing  probable viral illness with RAD  will give neb, steroids  flonase may help pt co nasal congestion irritating throat  will send covid/flu swabs, pt lives with mother/brother but they are not sick  pt needs PMD, put referral in computer  dc with steroids, inhaler

## 2022-12-01 NOTE — ED ADULT NURSE NOTE - NS ED NURSE LEVEL OF CONSCIOUSNESS AFFECT
· Patient with chronic history of dizziness/vertigo  · Patient states she had no more Antivert at home  · Patient states positive nausea however, no vomiting or diarrhea  · Room was spinning  · Start meclizine Q 8 hours around the clock  · Consult to cardiology  · Continue IV fluids Calm

## 2022-12-01 NOTE — ED PROVIDER NOTE - OBJECTIVE STATEMENT
2 days of nasal congestion, dry cough, mild sore throat  feels like she is having a little sob, feels like asthma  no fevers  no sick contacts  hx of asthma as a child, not currently on any meds

## 2022-12-01 NOTE — ED ADULT TRIAGE NOTE - CHIEF COMPLAINT QUOTE
"I have asthma and sinus congestion."  Used inhaler yesterday; speaking in full sentences, no s:s of respiratory distress.

## 2022-12-01 NOTE — ED PROVIDER NOTE - NSFOLLOWUPINSTRUCTIONS_ED_ALL_ED_FT
return here if worsening  someone should call you to get you a regular doctor or you can call the patient referral coordinator listed on the discharge sheet      Asthma    WHAT YOU NEED TO KNOW:    Asthma is a lung disease that makes breathing difficult. Chronic inflammation and reactions to triggers narrow the airways in the lungs. Asthma can become life-threatening if it is not managed.  Normal vs Asthmatic Bronchioles Adult         DISCHARGE INSTRUCTIONS:    Call your local emergency number (911 in the [place]) if:   •You have severe shortness of breath.      •The skin around your neck and ribs pulls in with each breath.      •Your peak flow numbers are in the red zone of your AAP.      Return to the emergency department if:   •You have shortness of breath, even after you take your short-term medicine as directed.      •Your lips or nails turn blue or gray.      Call your doctor or asthma specialist if:   •You run out of medicine before your next refill is due.      •Your symptoms get worse.      •You need to take more medicine than usual to control your symptoms.      •You have questions or concerns about your condition or care.      Medicines:   •Medicines may be used to decrease inflammation, open airways, and make it easier to breathe. Medicines may be inhaled, taken as a pill, or injected. Short-term medicines relieve your symptoms quickly. Long-term medicines are used to prevent future asthma attacks. Other medicines may be needed if your regular medicines are not able to prevent attacks. You may also need medicine to help control your allergies. Ask your healthcare provider for more information about any medicine you are given and how to take it safely.      •Take your medicine as directed. Contact your healthcare provider if you think your medicine is not helping or if you have side effects. Tell your provider if you are allergic to any medicine. Keep a list of the medicines, vitamins, and herbs you take. Include the amounts, and when and why you take them. Bring the list or the pill bottles to follow-up visits. Carry your medicine list with you in case of an emergency.      Manage your symptoms and prevent future attacks:   •Follow your Asthma Action Plan (AAP). This is a written plan that you and your healthcare provider create. It explains which medicine you need and when to change doses if necessary. It also explains how you can monitor symptoms and use a peak flow meter. The meter measures how well your lungs are working.      •Manage other health conditions, such as allergies, acid reflux, and sleep apnea.      •Identify and avoid triggers. These may include pets, dust mites, mold, and cockroaches.      •Do not smoke or be around others who smoke. Nicotine and other chemicals in cigarettes and cigars can cause lung damage. Ask your healthcare provider for information if you currently smoke and need help to quit. E-cigarettes or smokeless tobacco still contain nicotine. Talk to your healthcare provider before you use these products.      •Ask about the flu vaccine. The flu can make your asthma worse. You may need a yearly flu shot.      Follow up with your healthcare provider as directed: You will need to return to make sure your medicine is working and your symptoms are controlled. You may be referred to an asthma or allergy specialist. You may be asked to keep a record of your peak flow values and bring it with you to your appointments. Write down your questions so you remember to ask them during your visits.       © Copyright [employer] 2022           back to top                          © Copyright [employer] 2022

## 2022-12-01 NOTE — ED PROVIDER NOTE - PATIENT PORTAL LINK FT
You can access the FollowMyHealth Patient Portal offered by [facility] by registering at the following website: [email or web address]. By joining [facility]’s FollowMyHealth portal, you will also be able to view your health information using other applications (apps) compatible with our system.

## 2022-12-01 NOTE — ED ADULT NURSE NOTE - NSIMPLEMENTINTERV_GEN_ALL_ED
Blood work drawn per port and sent to lab.
HGB 11.0 shot not needed
Implemented All Universal Safety Interventions:  [employer] to call system. Call bell, personal items and telephone within reach. Instruct patient to call for assistance. Room bathroom lighting operational. Non-slip footwear when patient is off stretcher. Physically safe environment: no spills, clutter or unnecessary equipment. Stretcher in lowest position, wheels locked, appropriate side rails in place.

## 2022-12-05 DIAGNOSIS — R09.81 NASAL CONGESTION: ICD-10-CM

## 2022-12-05 DIAGNOSIS — Z20.822 CONTACT WITH AND (SUSPECTED) EXPOSURE TO COVID-19: ICD-10-CM

## 2022-12-05 DIAGNOSIS — J45.901 UNSPECIFIED ASTHMA WITH (ACUTE) EXACERBATION: ICD-10-CM

## 2022-12-05 DIAGNOSIS — J06.9 ACUTE UPPER RESPIRATORY INFECTION, UNSPECIFIED: ICD-10-CM

## 2022-12-08 ENCOUNTER — APPOINTMENT (OUTPATIENT)
Dept: PULMONOLOGY | Facility: CLINIC | Age: 27
End: 2022-12-08

## 2023-01-03 ENCOUNTER — RX RENEWAL (OUTPATIENT)
Age: 28
End: 2023-01-03

## 2023-01-03 RX ORDER — BUDESONIDE 0.5 MG/2ML
0.5 INHALANT ORAL
Qty: 60 | Refills: 2 | Status: ACTIVE | COMMUNITY
Start: 2022-03-11 | End: 1900-01-01

## (undated) DEVICE — CLEANING SHEATH ENDO-SCRUB FOR STORZ 7210AA TELESCOPE 4MM 0 DEGREE

## (undated) DEVICE — SUT PROLENE 3-0 36" SH

## (undated) DEVICE — DRSG FOAM STAMMBERGER SINUS

## (undated) DEVICE — DRSG GAUZE SPONGE 2X2" STERILE

## (undated) DEVICE — SOL ANTI FOG

## (undated) DEVICE — BLADE MEDTRONIC ENT QUADCUT ROTATABLE STRAIGHT 4MM X 13CM

## (undated) DEVICE — Device

## (undated) DEVICE — DRAPE MAYO STAND 23"

## (undated) DEVICE — ACCLARENT SET INFLATION DEVICE

## (undated) DEVICE — SUT PLAIN GUT 4-0 18" SC-1

## (undated) DEVICE — ELCTR BOVIE PENCIL BLADE 10FT

## (undated) DEVICE — SUT PDS II 4-0 18" P-3

## (undated) DEVICE — DRSG NASAL DRESSING HOLDER

## (undated) DEVICE — SUT CHROMIC 4-0 18" S-2

## (undated) DEVICE — TUBING IRRIGATION STRAIGHT SHOT

## (undated) DEVICE — DRSG TELFA 3 X 8

## (undated) DEVICE — ELCTR COAGULATOR HANDSWITCHING 10FR

## (undated) DEVICE — STAPLER SKIN VISI-STAT 35 WIDE

## (undated) DEVICE — SUT PROLENE 3-0 36" RB-1

## (undated) DEVICE — GLV 7.5 PROTEXIS (WHITE)

## (undated) DEVICE — BLADE MEDTRONIC ENT INFERIOR TURBINATE ROTATABLE STRAIGHT 2.9MM X 11CM

## (undated) DEVICE — TUBING SUCTION NONCONDUCTIVE 6MM X 12FT

## (undated) DEVICE — BEAVER BLADE MIN-BLADE ROUNDED TIP 1-SIDE SHARP (GREEN)

## (undated) DEVICE — SUT CHROMIC 4-0 27" RB-1